# Patient Record
Sex: FEMALE | Race: WHITE | Employment: FULL TIME | URBAN - METROPOLITAN AREA
[De-identification: names, ages, dates, MRNs, and addresses within clinical notes are randomized per-mention and may not be internally consistent; named-entity substitution may affect disease eponyms.]

---

## 2018-05-31 ENCOUNTER — OFFICE VISIT (OUTPATIENT)
Dept: FAMILY MEDICINE CLINIC | Facility: CLINIC | Age: 38
End: 2018-05-31

## 2018-05-31 DIAGNOSIS — Z11.1 SCREENING FOR TUBERCULOSIS: Primary | ICD-10-CM

## 2018-05-31 PROCEDURE — 86580 TB INTRADERMAL TEST: CPT

## 2018-06-02 ENCOUNTER — OFFICE VISIT (OUTPATIENT)
Dept: FAMILY MEDICINE CLINIC | Facility: CLINIC | Age: 38
End: 2018-06-02

## 2018-06-02 DIAGNOSIS — R76.11 POSITIVE PPD: Primary | ICD-10-CM

## 2018-06-02 NOTE — PROGRESS NOTES
Pt and 2 sons presented for TB read. Reports tenant on first floor of building has TB. Has been in touch with health dept and recommended pt and sons get tested. Pt with large area of induration at TB test site measuring 7cm W x 8.5cm L.   There is a ofelia

## 2018-06-04 NOTE — PROGRESS NOTES
Returned call from [facility] department.   Spoke with DIEGO Dixon, clarified size of of induration found at exam per her request.

## 2018-06-05 ENCOUNTER — TELEPHONE (OUTPATIENT)
Dept: FAMILY MEDICINE CLINIC | Facility: CLINIC | Age: 38
End: 2018-06-05

## 2018-06-05 NOTE — TELEPHONE ENCOUNTER
Spoke with pt; reports arm redness improving. Has followed up with health dept and having CXR today.

## 2018-06-14 ENCOUNTER — TELEPHONE (OUTPATIENT)
Dept: FAMILY MEDICINE CLINIC | Facility: CLINIC | Age: 38
End: 2018-06-14

## 2018-06-14 NOTE — TELEPHONE ENCOUNTER
Pt reports both her and her son Zenobia Armando had f/u appts on 6/12/18 at the health dept. Reports both her and son have latent TB infection but not active TB. Both will be starting treatment.

## 2020-07-17 ENCOUNTER — TELEPHONE (OUTPATIENT)
Dept: SCHEDULING | Age: 40
End: 2020-07-17

## 2021-08-10 ENCOUNTER — HOSPITAL ENCOUNTER (OUTPATIENT)
Age: 41
Discharge: HOME | End: 2021-08-10
Payer: COMMERCIAL

## 2021-08-10 VITALS
DIASTOLIC BLOOD PRESSURE: 90 MMHG | RESPIRATION RATE: 17 BRPM | TEMPERATURE: 98 F | OXYGEN SATURATION: 99 % | HEART RATE: 82 BPM | SYSTOLIC BLOOD PRESSURE: 155 MMHG

## 2021-08-10 DIAGNOSIS — R21 RASH AND NONSPECIFIC SKIN ERUPTION: Primary | ICD-10-CM

## 2021-08-10 DIAGNOSIS — M25.471 RIGHT ANKLE SWELLING: ICD-10-CM

## 2021-08-10 PROCEDURE — 99203 OFFICE O/P NEW LOW 30 MIN: CPT | Performed by: NURSE PRACTITIONER

## 2021-08-10 RX ORDER — PREDNISONE 20 MG/1
40 TABLET ORAL DAILY
Qty: 10 TABLET | Refills: 0 | Status: SHIPPED | OUTPATIENT
Start: 2021-08-10 | End: 2021-08-15

## 2021-08-11 NOTE — ED INITIAL ASSESSMENT (HPI)
Pt states was in [facility] and states Saturday noticed a spot on right leg. Pt states also noticing swelling in right ankle. Pt states the spots appeared all over body and saw physican in [address] and was told it was shingles and given medication.  Pt states wan

## 2021-08-11 NOTE — ED PROVIDER NOTES
Patient Seen in: [facility]      History   Patient presents with:  Rash: Rash and diarrhea - Entered by patient    Stated Complaint: Rash - Rash and diarrhea    HPI/Subjective:   HPI    This is a 49-year-old female presenting with rash and a Conjunctiva/sclera: Conjunctivae normal.      Pupils: Pupils are equal, round, and reactive to light. Cardiovascular:      Rate and Rhythm: Normal rate. Pulmonary:      Effort: Pulmonary effort is normal.      Breath sounds: Normal breath sounds.    Abd states she understands the risk factors but rather come back to [place] tomorrow to have the ultrasound or go to the [address] [place] [address] location tomorrow. Prednisone sent to the pharmacy.   Provided a phone number for patient to call [facility] t

## 2022-05-25 NOTE — PROGRESS NOTES
Mother here with 2 of her sons, requesting TB skin tests. Pt reports that a neighbor that lives below them in their building has started treatment for TB this week. Pt and family have lived in that building with the neighbor for many years.   TUBERCULOSIS S [Mother] : mother

## (undated) NOTE — LETTER
June 2, 2018    [address]      Dear Jorge Villegas:    The following are the results of your recent tests. Please review the list of test results.   Your result is the value on the left; we have also supplied the range of normal (